# Patient Record
Sex: FEMALE | Race: WHITE | NOT HISPANIC OR LATINO | URBAN - METROPOLITAN AREA
[De-identification: names, ages, dates, MRNs, and addresses within clinical notes are randomized per-mention and may not be internally consistent; named-entity substitution may affect disease eponyms.]

---

## 2024-05-31 ENCOUNTER — OFFICE VISIT (OUTPATIENT)
Dept: PRIMARY CARE | Facility: CLINIC | Age: 29
End: 2024-05-31
Payer: COMMERCIAL

## 2024-05-31 VITALS
WEIGHT: 159 LBS | BODY MASS INDEX: 27.14 KG/M2 | DIASTOLIC BLOOD PRESSURE: 66 MMHG | SYSTOLIC BLOOD PRESSURE: 98 MMHG | HEIGHT: 64 IN

## 2024-05-31 DIAGNOSIS — G43.009 MIGRAINE WITHOUT AURA AND WITHOUT STATUS MIGRAINOSUS, NOT INTRACTABLE: ICD-10-CM

## 2024-05-31 DIAGNOSIS — Z00.00 WELL ADULT EXAM: Primary | ICD-10-CM

## 2024-05-31 DIAGNOSIS — F41.9 ANXIETY: ICD-10-CM

## 2024-05-31 PROCEDURE — 99395 PREV VISIT EST AGE 18-39: CPT | Performed by: STUDENT IN AN ORGANIZED HEALTH CARE EDUCATION/TRAINING PROGRAM

## 2024-05-31 PROCEDURE — 99204 OFFICE O/P NEW MOD 45 MIN: CPT | Performed by: STUDENT IN AN ORGANIZED HEALTH CARE EDUCATION/TRAINING PROGRAM

## 2024-05-31 PROCEDURE — 1036F TOBACCO NON-USER: CPT | Performed by: STUDENT IN AN ORGANIZED HEALTH CARE EDUCATION/TRAINING PROGRAM

## 2024-05-31 RX ORDER — ESCITALOPRAM OXALATE 5 MG/1
5 TABLET ORAL DAILY
Qty: 30 TABLET | Refills: 2 | Status: SHIPPED | OUTPATIENT
Start: 2024-05-31 | End: 2024-08-29

## 2024-05-31 RX ORDER — SUMATRIPTAN 50 MG/1
50 TABLET, FILM COATED ORAL ONCE AS NEEDED
Qty: 9 TABLET | Refills: 5 | Status: SHIPPED | OUTPATIENT
Start: 2024-05-31 | End: 2025-05-31

## 2024-05-31 NOTE — PROGRESS NOTES
Subjective   Patient ID: Bria Paiz is a 28 y.o. female who presents for Establish Care (Migraines/).  HPI  Bria is here to establish care.    She reports a longstanding history of migraine headaches. Always left sided headaches. Starts as a mild, pounding headache and gets worse throughout the day. Associated nausea and vomiting. Also some sensation changes over the left side of her face. No associated visual changes. Worse with light, sound. Lasts ~24 hours at a time. She gets 1-2 migraines monthly, always has a migraine 7 days before her period. She takes ibuprofen for her headaches. Never got benefit from excedrin.    She usually drinks water throughout the day. Eats balanced diet. Sleeping well at night. She has been having more stress/anxiety since having her daughter 2 years ago. She has been coping better in the last 1 year. Reports some increased agitation and dwelling on things longer than she should. No SI/HI. Interested in discussing medications and therapy.    PMHx: migraine headaches  SurgHx: , cholecystectomy  FamHx: HTN, depression - father  SocialHx: never smoker. Never vaped. Never EtOH use. Never drug use. Working as a hairdresser. Lives at home with  and daughter (2 years old).     Review of Systems  12-point ROS was reviewed and is negative, unless otherwise noted in HPI    Objective   Vitals:    24 0850   BP: 98/66      Physical Exam  GEN: alert, conversant, NAD  HEENT: PERRL, EOMI, MMM, Tms pearly gray bilaterally  NECK: supple, no LAD appreciated  CHEST: CTAB  CV: S1, S2, RRR, no murmurs appreciated  ABD: soft, NT, ND  EXT: no significant LE edema  SKIN: warm, dry    Assessment/Plan   #well adult  - Counseled continued efforts on healthy lifestyle modification including balanced diet, and continued exercise for >5 minutes  - counseled age appropriate vaccines and preventative measures    #Anxiety  - Offered referral for counseling/therapy services, patient  agreeable  - Patient agreeable to start lexapro 5mg, SE profile discussed  - Will have the patient return in 1 month to discuss change in symptoms with medication    #migraine headaches  2 monthly.   - Counseled continue good PO hydration, sleep hygiene practices  - Given sumatriptan 50mg PRN    Health Maintenance:  Vaccines: COVID (declines), Flu (declines), TDAP (2022)  Screening: Paptest (following with GYN at [facility]) Dr. Waite.   Labs: Recent lab testing at OSH, none needed today     RTC in 1 month, or sooner PRN    Meño Perry,

## 2024-06-03 ENCOUNTER — APPOINTMENT (OUTPATIENT)
Dept: PRIMARY CARE | Facility: CLINIC | Age: 29
End: 2024-06-03
Payer: COMMERCIAL

## 2024-06-10 ENCOUNTER — TELEPHONE (OUTPATIENT)
Dept: PRIMARY CARE | Facility: CLINIC | Age: 29
End: 2024-06-10
Payer: COMMERCIAL

## 2024-06-22 DIAGNOSIS — F41.9 ANXIETY: ICD-10-CM

## 2024-06-24 RX ORDER — ESCITALOPRAM OXALATE 5 MG/1
5 TABLET ORAL DAILY
Qty: 90 TABLET | Refills: 1 | Status: SHIPPED | OUTPATIENT
Start: 2024-06-24

## 2024-07-01 ENCOUNTER — APPOINTMENT (OUTPATIENT)
Dept: PRIMARY CARE | Facility: CLINIC | Age: 29
End: 2024-07-01
Payer: COMMERCIAL

## 2024-07-01 VITALS — DIASTOLIC BLOOD PRESSURE: 68 MMHG | SYSTOLIC BLOOD PRESSURE: 124 MMHG

## 2024-07-01 DIAGNOSIS — F41.9 ANXIETY: ICD-10-CM

## 2024-07-01 DIAGNOSIS — G43.009 MIGRAINE WITHOUT AURA AND WITHOUT STATUS MIGRAINOSUS, NOT INTRACTABLE: Primary | ICD-10-CM

## 2024-07-01 PROCEDURE — 99214 OFFICE O/P EST MOD 30 MIN: CPT | Performed by: STUDENT IN AN ORGANIZED HEALTH CARE EDUCATION/TRAINING PROGRAM

## 2024-07-01 PROCEDURE — 1036F TOBACCO NON-USER: CPT | Performed by: STUDENT IN AN ORGANIZED HEALTH CARE EDUCATION/TRAINING PROGRAM

## 2024-07-01 RX ORDER — ESCITALOPRAM OXALATE 10 MG/1
10 TABLET ORAL DAILY
Qty: 90 TABLET | Refills: 1 | Status: SHIPPED | OUTPATIENT
Start: 2024-07-01 | End: 2024-12-28

## 2024-07-01 NOTE — PROGRESS NOTES
Subjective   Patient ID: Bria Paiz is a 28 y.o. female who presents for Follow-up.  HPI  Bria is here for follow up visit.    She is tolerating the lexapro well without side effects. She did have some nausea initially which triggered a migraine headache. She had discontinued the lexapro for ~2 days, resumed with some nausea initially which has since resolved. Her  has noticed that she is less irritated and seems less anxious. She is interested in increasing the dose. Her daughter was sick with a viral illness, which was causing her stress but is much improved at this time. She feels well overall.    She took 2 tabs of sumatriptan with quick resolution of her migraine headaches.    Review of Systems  12-point ROS was reviewed and is negative, unless otherwise noted in HPI    Objective   Vitals:    07/01/24 1112   BP: 124/68      Physical Exam  GEN: alert, conversant, NAD  HEENT: PERRL, EOMI, MMM, Tms pearly gray bilaterally  NECK: supple, no LAD appreciated  CHEST: CTAB  CV: S1, S2, RRR, no murmurs appreciated  ABD: soft, NT, ND  EXT: no significant LE edema  SKIN: warm, dry    Assessment/Plan   #Anxiety  - Patient is working to establish with a therapist/counselor  - Increase Lexapro to 10mg daily    #migraine headaches  2 monthly.   - Counseled continue good PO hydration, sleep hygiene practices  - Given sumatriptan 50mg PRN    Health Maintenance:  Vaccines: COVID (declines), Flu (declines), TDAP (2022)  Screening: Paptest (following with GYN at [facility]) Dr. Waite.   Labs: Recent lab testing at OSH, none needed today     RTC in 3 months, or sooner PRN    Meño Perry DO

## 2024-10-07 ENCOUNTER — APPOINTMENT (OUTPATIENT)
Dept: PRIMARY CARE | Facility: CLINIC | Age: 29
End: 2024-10-07
Payer: COMMERCIAL

## 2024-10-07 DIAGNOSIS — G43.009 MIGRAINE WITHOUT AURA AND WITHOUT STATUS MIGRAINOSUS, NOT INTRACTABLE: Primary | ICD-10-CM

## 2024-10-07 DIAGNOSIS — F41.9 ANXIETY: ICD-10-CM

## 2024-10-07 PROCEDURE — 99213 OFFICE O/P EST LOW 20 MIN: CPT | Performed by: STUDENT IN AN ORGANIZED HEALTH CARE EDUCATION/TRAINING PROGRAM

## 2024-10-07 PROCEDURE — 1036F TOBACCO NON-USER: CPT | Performed by: STUDENT IN AN ORGANIZED HEALTH CARE EDUCATION/TRAINING PROGRAM

## 2024-10-07 NOTE — PROGRESS NOTES
Subjective   Patient ID: Bria Paiz is a 29 y.o. female who presents for Follow-up and discuss migraines.  HPI  Bria is here for follow up visit.    She has been having more frequent migraines in the last month, they have been more severe. Getting ~4-5 headaches per month, with visual aura. She is noticing less benefit from the sumatriptan than when she started taking it initially. She is getting incomplete relief. Has taken every over the counter option, has trialed rizatiptan in the past.    Anxiety is well controlled on lexapro, she would like to continue at the same dose.    Review of Systems  12-point ROS was reviewed and is negative, unless otherwise noted in HPI    Objective   There were no vitals filed for this visit.     Physical Exam  GEN: alert, conversant, NAD    Limited due to virtual visit    Assessment/Plan   #Anxiety  Stable  - Patient is working to establish with a therapist/counselor  - Continue Lexapro to 10mg daily    #migraine headaches, worsened severity and frequency  Does not meet criteria for prophylaxis  Has tried ibuprofen, excedrin, acetaminophen, antihistamines, rizatriptan  Sumatriptan with incomplete relief. Take sumatriptan with naproxen at the onset of headaches  - Counseled continue good PO hydration, sleep hygiene practices  - Sent nurtec 75mg PRN migraine headache to pharmacy    Health Maintenance:  Vaccines: COVID (declines), Flu (declines), TDAP (2022)  Screening: Paptest (following with GYN at [facility]) Dr. Waite.   Labs: Recent lab testing at OSH, none needed today     RTC in 3 months, or sooner PRN    I spent 21 minutes reviewing chart, interviewing patient and documenting in the chart.    Meño Perry,

## 2024-10-22 DIAGNOSIS — G43.009 MIGRAINE WITHOUT AURA AND WITHOUT STATUS MIGRAINOSUS, NOT INTRACTABLE: ICD-10-CM

## 2024-10-22 RX ORDER — RIMEGEPANT SULFATE 75 MG/75MG
TABLET, ORALLY DISINTEGRATING ORAL
Qty: 15 TABLET | Refills: 5 | Status: SHIPPED | OUTPATIENT
Start: 2024-10-22

## 2024-11-17 DIAGNOSIS — G43.009 MIGRAINE WITHOUT AURA AND WITHOUT STATUS MIGRAINOSUS, NOT INTRACTABLE: ICD-10-CM

## 2024-11-18 RX ORDER — SUMATRIPTAN 50 MG/1
50 TABLET, FILM COATED ORAL ONCE AS NEEDED
Qty: 9 TABLET | Refills: 2 | Status: SHIPPED | OUTPATIENT
Start: 2024-11-18 | End: 2025-11-18

## 2025-01-16 DIAGNOSIS — F41.9 ANXIETY: ICD-10-CM

## 2025-01-16 RX ORDER — ESCITALOPRAM OXALATE 10 MG/1
10 TABLET ORAL DAILY
Qty: 90 TABLET | Refills: 0 | Status: SHIPPED | OUTPATIENT
Start: 2025-01-16

## 2025-02-22 DIAGNOSIS — G43.009 MIGRAINE WITHOUT AURA AND WITHOUT STATUS MIGRAINOSUS, NOT INTRACTABLE: ICD-10-CM

## 2025-02-24 RX ORDER — SUMATRIPTAN SUCCINATE 50 MG/1
50 TABLET ORAL ONCE AS NEEDED
Qty: 9 TABLET | Refills: 0 | Status: SHIPPED | OUTPATIENT
Start: 2025-02-24 | End: 2026-02-24

## 2025-03-23 DIAGNOSIS — G43.009 MIGRAINE WITHOUT AURA AND WITHOUT STATUS MIGRAINOSUS, NOT INTRACTABLE: ICD-10-CM

## 2025-03-25 RX ORDER — SUMATRIPTAN SUCCINATE 50 MG/1
50 TABLET ORAL ONCE AS NEEDED
Qty: 9 TABLET | Refills: 3 | Status: SHIPPED | OUTPATIENT
Start: 2025-03-25 | End: 2026-03-25

## 2025-05-26 DIAGNOSIS — F41.9 ANXIETY: ICD-10-CM

## 2025-05-27 RX ORDER — ESCITALOPRAM OXALATE 10 MG/1
10 TABLET ORAL DAILY
Qty: 30 TABLET | Refills: 0 | Status: SHIPPED | OUTPATIENT
Start: 2025-05-27

## 2025-07-30 ENCOUNTER — APPOINTMENT (OUTPATIENT)
Dept: PRIMARY CARE | Facility: CLINIC | Age: 30
End: 2025-07-30
Payer: COMMERCIAL

## 2025-07-30 DIAGNOSIS — F41.9 ANXIETY: ICD-10-CM

## 2025-07-30 DIAGNOSIS — G43.009 MIGRAINE WITHOUT AURA AND WITHOUT STATUS MIGRAINOSUS, NOT INTRACTABLE: ICD-10-CM

## 2025-07-30 PROCEDURE — 99212 OFFICE O/P EST SF 10 MIN: CPT | Performed by: STUDENT IN AN ORGANIZED HEALTH CARE EDUCATION/TRAINING PROGRAM

## 2025-07-30 RX ORDER — ESCITALOPRAM OXALATE 10 MG/1
10 TABLET ORAL DAILY
Qty: 90 TABLET | Refills: 3 | Status: SHIPPED | OUTPATIENT
Start: 2025-07-30

## 2025-07-30 NOTE — PROGRESS NOTES
Subjective   Patient ID: Bria Paiz is a 29 y.o. female who presents for Follow-up (Med refill).  HPI  Bria is here for follow up visit.    Headaches have been very well controlled with nurtec. Was taking every other day initially, but has been able to wean to use only PRN with great relief of her headaches.    Mood is stable, stress levels are stable. She would like to continue medications at same dosage.    Review of Systems  12-point ROS was reviewed and is negative, unless otherwise noted in HPI    Objective   There were no vitals filed for this visit.     Physical Exam  GEN: alert, conversant, NAD    Limited due to virtual visit    Assessment/Plan   #Anxiety  Stable  - Patient is working to establish with a therapist/counselor  - Continue Lexapro to 10mg daily    #migraine headaches, much improved  Has tried ibuprofen, excedrin, acetaminophen, antihistamines, rizatriptan  Sumatriptan with incomplete relief. Take sumatriptan with naproxen at the onset of headaches  - Counseled continue good PO hydration, sleep hygiene practices  - Continue nurtec 75mg PRN migraine headache to pharmacy    Health Maintenance:  Vaccines: COVID (declines), Flu (declines), TDAP (2022)  Screening: Paptest (following with GYN at [facility]) Dr. Waite.   Labs: Reviewed     RTC as scheduled for follow up, or sooner PRN    I spent 18 minutes reviewing chart, interviewing patient and documenting in the chart.    Meño Perry,

## 2025-08-18 ENCOUNTER — APPOINTMENT (OUTPATIENT)
Dept: PRIMARY CARE | Facility: CLINIC | Age: 30
End: 2025-08-18
Payer: COMMERCIAL

## 2025-08-18 VITALS — DIASTOLIC BLOOD PRESSURE: 64 MMHG | SYSTOLIC BLOOD PRESSURE: 99 MMHG | WEIGHT: 150 LBS | BODY MASS INDEX: 25.75 KG/M2

## 2025-08-18 DIAGNOSIS — Z00.00 WELL ADULT EXAM: ICD-10-CM

## 2025-08-18 DIAGNOSIS — F41.9 ANXIETY: ICD-10-CM

## 2025-08-18 DIAGNOSIS — R10.13 DYSPEPSIA: Primary | ICD-10-CM

## 2025-08-18 DIAGNOSIS — G43.009 MIGRAINE WITHOUT AURA AND WITHOUT STATUS MIGRAINOSUS, NOT INTRACTABLE: ICD-10-CM

## 2025-08-18 PROCEDURE — 99395 PREV VISIT EST AGE 18-39: CPT | Performed by: STUDENT IN AN ORGANIZED HEALTH CARE EDUCATION/TRAINING PROGRAM

## 2025-08-18 PROCEDURE — 1036F TOBACCO NON-USER: CPT | Performed by: STUDENT IN AN ORGANIZED HEALTH CARE EDUCATION/TRAINING PROGRAM

## 2025-08-18 RX ORDER — OMEPRAZOLE 20 MG/1
20 CAPSULE, DELAYED RELEASE ORAL DAILY
Qty: 30 CAPSULE | Refills: 11 | Status: SHIPPED | OUTPATIENT
Start: 2025-08-18 | End: 2026-08-18

## 2025-08-18 ASSESSMENT — PROMIS GLOBAL HEALTH SCALE
EMOTIONAL_PROBLEMS: RARELY
RATE_AVERAGE_FATIGUE: MILD
RATE_QUALITY_OF_LIFE: EXCELLENT
RATE_SOCIAL_SATISFACTION: VERY GOOD
RATE_GENERAL_HEALTH: VERY GOOD
CARRYOUT_PHYSICAL_ACTIVITIES: COMPLETELY
RATE_PHYSICAL_HEALTH: VERY GOOD
RATE_AVERAGE_PAIN: 0
RATE_MENTAL_HEALTH: VERY GOOD
CARRYOUT_SOCIAL_ACTIVITIES: EXCELLENT